# Patient Record
Sex: MALE | Race: WHITE
[De-identification: names, ages, dates, MRNs, and addresses within clinical notes are randomized per-mention and may not be internally consistent; named-entity substitution may affect disease eponyms.]

---

## 2018-03-07 ENCOUNTER — HOSPITAL ENCOUNTER (EMERGENCY)
Dept: HOSPITAL 89 - ER | Age: 23
Discharge: HOME | End: 2018-03-07
Payer: COMMERCIAL

## 2018-03-07 VITALS — WEIGHT: 300 LBS | BODY MASS INDEX: 44.43 KG/M2 | HEIGHT: 69 IN

## 2018-03-07 VITALS — DIASTOLIC BLOOD PRESSURE: 85 MMHG | SYSTOLIC BLOOD PRESSURE: 132 MMHG

## 2018-03-07 DIAGNOSIS — K55.069: Primary | ICD-10-CM

## 2018-03-07 LAB
INR PPP: 0.99
PLATELET COUNT, AUTOMATED: 387 K/UL (ref 150–450)

## 2018-03-07 PROCEDURE — 85610 PROTHROMBIN TIME: CPT

## 2018-03-07 PROCEDURE — 81001 URINALYSIS AUTO W/SCOPE: CPT

## 2018-03-07 PROCEDURE — 84460 ALANINE AMINO (ALT) (SGPT): CPT

## 2018-03-07 PROCEDURE — 82947 ASSAY GLUCOSE BLOOD QUANT: CPT

## 2018-03-07 PROCEDURE — 82435 ASSAY OF BLOOD CHLORIDE: CPT

## 2018-03-07 PROCEDURE — 82040 ASSAY OF SERUM ALBUMIN: CPT

## 2018-03-07 PROCEDURE — 96374 THER/PROPH/DIAG INJ IV PUSH: CPT

## 2018-03-07 PROCEDURE — 84295 ASSAY OF SERUM SODIUM: CPT

## 2018-03-07 PROCEDURE — 71046 X-RAY EXAM CHEST 2 VIEWS: CPT

## 2018-03-07 PROCEDURE — 96361 HYDRATE IV INFUSION ADD-ON: CPT

## 2018-03-07 PROCEDURE — 84450 TRANSFERASE (AST) (SGOT): CPT

## 2018-03-07 PROCEDURE — 83690 ASSAY OF LIPASE: CPT

## 2018-03-07 PROCEDURE — 84132 ASSAY OF SERUM POTASSIUM: CPT

## 2018-03-07 PROCEDURE — 84520 ASSAY OF UREA NITROGEN: CPT

## 2018-03-07 PROCEDURE — 84075 ASSAY ALKALINE PHOSPHATASE: CPT

## 2018-03-07 PROCEDURE — 82565 ASSAY OF CREATININE: CPT

## 2018-03-07 PROCEDURE — 74177 CT ABD & PELVIS W/CONTRAST: CPT

## 2018-03-07 PROCEDURE — 82374 ASSAY BLOOD CARBON DIOXIDE: CPT

## 2018-03-07 PROCEDURE — 99284 EMERGENCY DEPT VISIT MOD MDM: CPT

## 2018-03-07 PROCEDURE — 82310 ASSAY OF CALCIUM: CPT

## 2018-03-07 PROCEDURE — 84155 ASSAY OF PROTEIN SERUM: CPT

## 2018-03-07 PROCEDURE — 85730 THROMBOPLASTIN TIME PARTIAL: CPT

## 2018-03-07 PROCEDURE — 82247 BILIRUBIN TOTAL: CPT

## 2018-03-07 PROCEDURE — 85025 COMPLETE CBC W/AUTO DIFF WBC: CPT

## 2018-03-07 PROCEDURE — 76705 ECHO EXAM OF ABDOMEN: CPT

## 2018-03-07 NOTE — RADIOLOGY IMAGING REPORT
FACILITY: [facility] [place] 

 

PATIENT NAME: Bj Briones

: 1995

MR: [id number]

V: [id number]

EXAM DATE: 

ORDERING PHYSICIAN: MALDONADO RAHMAN

TECHNOLOGIST: 

 

Location: [facility]

Patient: Bj Briones

: 1995

MRN: [id number]

Visit/Account:[id number]

Date of Sevice:  3/07/2018

 

ACCESSION #: [id number]

 

2 VIEWS CHEST

 

INDICATION: Right upper quadrant pain for 2 days.

 

COMPARISON: None available

 

FINDINGS:

Cardiomediastinal silhouette and pulmonary vessels within normal limits.

There is no focal infiltrate or lobar consolidation.

There is no pneumothorax or pleural effusion.

No nodule.

Upper abdomen is unremarkable. No acute bony abnormality.

 

IMPRESSION:

1. No acute cardiopulmonary process.

 

Report Dictated By: Eduardo Uriostegui at 3/7/2018 5:05 PM

 

Report E-Signed By: Eduardo Uriostegui  at 3/7/2018 5:06 PM

 

WSN:[id number]

## 2018-03-07 NOTE — RADIOLOGY IMAGING REPORT
FACILITY: [facility] 

 

PATIENT NAME: Bj Briones

: 1995

MR: [id number]

V: [id number]

EXAM DATE: 

ORDERING PHYSICIAN: MALDONADO RAHMAN

TECHNOLOGIST: 

 

Location: [facility]

Patient: Bj Briones

: 1995

MRN: [id number]

Visit/Account:[id number]

Date of Sevice:  3/07/2018

 

ACCESSION #: [id number]

 

EXAMINATION: CT abdomen and pelvis with IV contrast

 

HISTORY:  Abdominal pain.

 

TECHNIQUE:   Axial CT images of the abdomen and pelvis were obtained with IV contrast, with coronal a
nd sagittal 2D reconstructed images.

One of the following dose optimization techniques was utilized in the performance of this exam: Autom
ated exposure control; adjustment of the mA and/or kV according to the patient's size; or use of an i
terative  reconstruction technique.  Specific details can be referenced in the facility's radiology C
T exam operational policy.

 

Contrast:  80 mL of IV Isovue-370.

 

COMPARISON:  None.

 

FINDINGS:

The patient reportedly vomited after contrast administration, resulting in a significant scan delay. 
Scanning occurred approximately 7 1/2 minutes after contrast administration.

 

Liver:  Fatty infiltration of the liver. Normal hepatic size and morphology.

Gallbladder and bile ducts:  Negative.

Spleen:  Negative.

Pancreas:  Negative.

Adrenal glands:  Negative.

Kidneys:  Normal size and morphology of both kidneys. No hydronephrosis. There is contrast in the toni
al collecting systems related to the delayed scan time. This precludes evaluation for small nonobstru
cting calculi.

 

Bowel and peritoneum:  The small bowel and colon are normal in caliber, without evidence of obstructi
on or any focal inflammatory process. No bowel wall thickening. Unremarkable appendix in the right lo
wer quadrant.

 

There is a localized region of fat stranding in the upper right abdomen just deep to the abdominal wa
ll, suspicious for a focal omental infarct. No adjacent bowel wall thickening. No free fluid or free 
intraperitoneal air.

 

Pelvic  structures:  Negative.

Lymph node assessment:  Negative.

Vessels:  Negative.

 

Musculoskeletal:   Negative.

Body wall:  Negative.

Lung bases:  Negative.

 

IMPRESSION:

1. Small region of localized fat stranding in the right upper abdomen just deep to the abdominal wall
. CT appearance is suspicious for a focal omental infarct.

2. No other acute intra-abdominal findings.

3. Normal appendix.

4. Fatty infiltration of the liver.

 

 

Report Dictated By: Avinash Christensen MD at 3/7/2018 6:21 PM

 

Report E-Signed By: Avinash Christensen MD  at 3/7/2018 6:29 PM

 

WSN:[id number]

## 2018-03-07 NOTE — ER REPORT
History and Physical


Time Seen By MD:  15:47


Hx. of Stated Complaint:  


patient having RU/MQ abd pain, denies radiation, states has been hurting for 2 


days, no N/V/D


 (MALDONADO RAHMAN MD)


HPI/ROS


CHIEF COMPLAINT: Right upper quadrant pain





HISTORY OF PRESENT ILLNESS: 22-year-old male morbidly obese comes emergency 

Department with a complaint of right upper quadrant pain sent here by the 

urgent care. Prior to presentation urgent care he had 2 large sawdust [employer] 

for [employer] and started getting pain of his right upper quadrant. Patient 

denies nausea vomiting diarrhea fever chills no right lower left lower back or 

other discomfort pain is sharp stabbing localized colicky nature had this 

episodically before mostly when he eats fatty foods. Patient has no additional 

complaints at this time





REVIEW OF SYSTEMS:


Respiratory: No cough, no dyspnea.


Cardiovascular: No chest pain, no palpitations.


Gastrointestinal: No vomiting, has abdominal pain.


Musculoskeletal: No back pain.


Remainder of the 14 system rev:  Yes


 (MALDONADO RAHMAN MD)


Allergies:  


Coded Allergies:  


     No Known Drug Allergies (Unverified , 3/7/18)


Home Meds


No Active Prescriptions or Reported Meds


Reviewed Nurses Notes:  Yes


Old Medical Records Reviewed:  Yes


 (MALDONADO RAHMAN MD)


Hx Substance Use Disorder:  No


 (MALDONADO RAHMAN MD)


Constitutional





Vital Sign - Last 24 Hours








 3/7/18 3/7/18 3/7/18 3/7/18





 15:34 15:38 15:40 16:00


 


Temp   99.1 


 


Pulse ???  122 


 


Resp   14 


 


B/P (MAP)  158/91 (113) 158/91 147/79 (101)


 


Pulse Ox   97 


 


    





 3/7/18 3/7/18 3/7/18 3/7/18





 16:04 16:19 16:30 16:34


 


Pulse 98 92  82


 


B/P (MAP)   107/55 (72) 


 


Pulse Ox 92 95  98





 3/7/18 3/7/18 3/7/18 3/7/18





 17:00 17:04 17:09 17:24


 


Pulse  92 101 105


 


B/P (MAP) 119/72 (88)   


 


Pulse Ox  93 93 93





 3/7/18 3/7/18 3/7/18 3/7/18





 17:30 17:39 17:54 18:09


 


Pulse  ??? 97 93


 


B/P (MAP) 139/65 (89)   


 


Pulse Ox   91 91





 3/7/18 3/7/18 3/7/18 3/7/18





 18:24 18:30 18:39 18:45


 


Pulse 101  97 


 


B/P (MAP)  ???/??? (1665)  132/85 (101)


 


Pulse Ox 95  94 





 (PORFIRIO CONCEPCION DO)


Physical Exam


  General Appearance: The patient is alert, has no immediate need for airway 

protection and no current signs of toxicity.  [ ]


Eyes: Pupils equal and round no injection.


Respiratory: Chest is non tender, lungs are clear to auscultation.


Cardiac: regular rate and rhythm [ ]


Gastrointestinal: Abdomen is soft nontender to right upper quadrant deep 

palpation bedside ultrasound demonstrates thickened gallbladder wall but 

otherwise difficult to appreciate due to morbid obesity will have a formal 

ultrasound done no rebound or guarding or masses


Musculoskeletal:  Neck: Neck is supple and non tender.


   Extremities have full range of motion and are non tender.


Skin: No rashes or lesions.


[ ]


DIFFERENTIAL DIAGNOSIS: After history and physical exam differential diagnosis 

was considered for gallbladder cholelithiasis choledocholithiasis and rule out 

acute appendicitis if needed


 (MALDONADO RAHMAN MD)





Medical Decision Making


Data Points


Result Diagram:  


3/7/18 1547                                                                    

            3/7/18 1547





Laboratory





Hematology








Test


  3/7/18


15:47 3/7/18


16:44


 


Red Blood Count


  6.04 M/uL


(4.00-5.60) 


 


 


Mean Corpuscular Volume


  82.6 fL


(80.0-96.0) 


 


 


Mean Corpuscular Hemoglobin


  28.4 pg


(26.0-33.0) 


 


 


Mean Corpuscular Hemoglobin


Concent 34.4 g/dL


(32.0-36.0) 


 


 


Red Cell Distribution Width


  13.8 %


(11.5-14.5) 


 


 


Mean Platelet Volume


  9.5 fL


(7.2-11.1) 


 


 


Neutrophils (%) (Auto)


  68.4 %


(39.4-72.5) 


 


 


Lymphocytes (%) (Auto)


  22.3 %


(17.6-49.6) 


 


 


Monocytes (%) (Auto)


  6.7 %


(4.1-12.4) 


 


 


Eosinophils (%) (Auto)


  1.7 %


(0.4-6.7) 


 


 


Basophils (%) (Auto)


  0.9 %


(0.3-1.4) 


 


 


Nucleated RBC Relative Count


(auto) 0.0 /100WBC 


  


 


 


Neutrophils # (Auto)


  7.8 K/uL


(2.0-7.4) 


 


 


Lymphocytes # (Auto)


  2.5 K/uL


(1.3-3.6) 


 


 


Monocytes # (Auto)


  0.8 K/uL


(0.3-1.0) 


 


 


Eosinophils # (Auto)


  0.2 K/uL


(0.0-0.5) 


 


 


Basophils # (Auto)


  0.1 K/uL


(0.0-0.1) 


 


 


Nucleated RBC Absolute Count


(auto) 0.00 K/uL 


  


 


 


Prothrombin Time


  13.1 seconds


(12.0-14.4) 


 


 


Prothromb Time International


Ratio 0.99 


  


 


 


Activated Partial


Thromboplast Time 29 seconds


(23-35) 


 


 


Sodium Level


  143 mmol/L


(137-145) 


 


 


Potassium Level


  4.1 mmol/L


(3.5-5.0) 


 


 


Chloride Level


  101 mmol/L


() 


 


 


Carbon Dioxide Level


  25 mmol/L


(22-30) 


 


 


Blood Urea Nitrogen


  14 mg/dl


(9-21) 


 


 


Creatinine


  0.90 mg/dl


(0.66-1.25) 


 


 


Glomerular Filtration Rate


Calc > 60.0 


  


 


 


Random Glucose


  90 mg/dl


() 


 


 


Calcium Level


  9.8 mg/dl


(8.4-10.2) 


 


 


Total Bilirubin


  0.4 mg/dl


(0.2-1.3) 


 


 


Aspartate Amino Transf


(AST/SGOT) 47 U/L (0-35) 


  


 


 


Alanine Aminotransferase


(ALT/SGPT) 98 U/L (0-56) 


  


 


 


Alkaline Phosphatase 93 U/L (0-126)  


 


Total Protein


  8.9 gm/dl


(6.3-8.2) 


 


 


Albumin


  4.8 g/dl


(3.5-5.0) 


 


 


Lipase


  188 U/L


() 


 


 


Urine Color  Yellow 


 


Urine Clarity  Clear 


 


Urine pH


  


  6.0 pH


(4.8-9.5)


 


Urine Specific Gravity  1.013 


 


Urine Protein


  


  Negative mg/dL


(NEGATIVE)


 


Urine Glucose (UA)


  


  Negative mg/dL


(NEGATIVE)


 


Urine Ketones


  


  Negative mg/dL


(NEGATIVE)


 


Urine Blood


  


  Negative


(NEGATIVE)


 


Urine Nitrite


  


  Negative


(NEGATIVE)


 


Urine Bilirubin


  


  Negative


(NEGATIVE)


 


Urine Urobilinogen


  


  Negative mg/dL


(0.2-1.9)


 


Urine Leukocyte Esterase


  


  Negative


(NEGATIVE)


 


Urine RBC


  


  None /HPF


(0-2/HPF)


 


Urine WBC


  


  3 /HPF


(0-5/HPF)


 


Urine Squamous Epithelial


Cells 


  Few /LPF


(</=FEW)


 


Urine Renal Epithelial Cells


  


  Few /LPF


(NONE-FEW)


 


Urine Bacteria


  


  Negative /HPF


(NONE-FEW)


 


Urine Mucus


  


  None /HPF


(NONE-FEW)








Chemistry








Test


  3/7/18


15:47 3/7/18


16:44


 


White Blood Count


  11.4 k/uL


(4.5-11.0) 


 


 


Red Blood Count


  6.04 M/uL


(4.00-5.60) 


 


 


Hemoglobin


  17.2 g/dL


(14.0-18.0) 


 


 


Hematocrit


  49.9 %


(42.0-52.0) 


 


 


Mean Corpuscular Volume


  82.6 fL


(80.0-96.0) 


 


 


Mean Corpuscular Hemoglobin


  28.4 pg


(26.0-33.0) 


 


 


Mean Corpuscular Hemoglobin


Concent 34.4 g/dL


(32.0-36.0) 


 


 


Red Cell Distribution Width


  13.8 %


(11.5-14.5) 


 


 


Platelet Count


  387 K/uL


(150-450) 


 


 


Mean Platelet Volume


  9.5 fL


(7.2-11.1) 


 


 


Neutrophils (%) (Auto)


  68.4 %


(39.4-72.5) 


 


 


Lymphocytes (%) (Auto)


  22.3 %


(17.6-49.6) 


 


 


Monocytes (%) (Auto)


  6.7 %


(4.1-12.4) 


 


 


Eosinophils (%) (Auto)


  1.7 %


(0.4-6.7) 


 


 


Basophils (%) (Auto)


  0.9 %


(0.3-1.4) 


 


 


Nucleated RBC Relative Count


(auto) 0.0 /100WBC 


  


 


 


Neutrophils # (Auto)


  7.8 K/uL


(2.0-7.4) 


 


 


Lymphocytes # (Auto)


  2.5 K/uL


(1.3-3.6) 


 


 


Monocytes # (Auto)


  0.8 K/uL


(0.3-1.0) 


 


 


Eosinophils # (Auto)


  0.2 K/uL


(0.0-0.5) 


 


 


Basophils # (Auto)


  0.1 K/uL


(0.0-0.1) 


 


 


Nucleated RBC Absolute Count


(auto) 0.00 K/uL 


  


 


 


Prothrombin Time


  13.1 seconds


(12.0-14.4) 


 


 


Prothromb Time International


Ratio 0.99 


  


 


 


Activated Partial


Thromboplast Time 29 seconds


(23-35) 


 


 


Glomerular Filtration Rate


Calc > 60.0 


  


 


 


Calcium Level


  9.8 mg/dl


(8.4-10.2) 


 


 


Total Bilirubin


  0.4 mg/dl


(0.2-1.3) 


 


 


Aspartate Amino Transf


(AST/SGOT) 47 U/L (0-35) 


  


 


 


Alanine Aminotransferase


(ALT/SGPT) 98 U/L (0-56) 


  


 


 


Alkaline Phosphatase 93 U/L (0-126)  


 


Total Protein


  8.9 gm/dl


(6.3-8.2) 


 


 


Albumin


  4.8 g/dl


(3.5-5.0) 


 


 


Lipase


  188 U/L


() 


 


 


Urine Color  Yellow 


 


Urine Clarity  Clear 


 


Urine pH


  


  6.0 pH


(4.8-9.5)


 


Urine Specific Gravity  1.013 


 


Urine Protein


  


  Negative mg/dL


(NEGATIVE)


 


Urine Glucose (UA)


  


  Negative mg/dL


(NEGATIVE)


 


Urine Ketones


  


  Negative mg/dL


(NEGATIVE)


 


Urine Blood


  


  Negative


(NEGATIVE)


 


Urine Nitrite


  


  Negative


(NEGATIVE)


 


Urine Bilirubin


  


  Negative


(NEGATIVE)


 


Urine Urobilinogen


  


  Negative mg/dL


(0.2-1.9)


 


Urine Leukocyte Esterase


  


  Negative


(NEGATIVE)


 


Urine RBC


  


  None /HPF


(0-2/HPF)


 


Urine WBC


  


  3 /HPF


(0-5/HPF)


 


Urine Squamous Epithelial


Cells 


  Few /LPF


(</=FEW)


 


Urine Renal Epithelial Cells


  


  Few /LPF


(NONE-FEW)


 


Urine Bacteria


  


  Negative /HPF


(NONE-FEW)


 


Urine Mucus


  


  None /HPF


(NONE-FEW)








Coagulation








Test


  3/7/18


15:47


 


Prothrombin Time 13.1 seconds 


 


Prothromb Time International


Ratio 0.99 


 


 


Activated Partial


Thromboplast Time 29 seconds 


 








Urinalysis








Test


  3/7/18


16:44


 


Urine Color Yellow 


 


Urine Clarity Clear 


 


Urine pH


  6.0 pH


(4.8-9.5)


 


Urine Specific Gravity 1.013 


 


Urine Protein


  Negative mg/dL


(NEGATIVE)


 


Urine Glucose (UA)


  Negative mg/dL


(NEGATIVE)


 


Urine Ketones


  Negative mg/dL


(NEGATIVE)


 


Urine Blood


  Negative


(NEGATIVE)


 


Urine Nitrite


  Negative


(NEGATIVE)


 


Urine Bilirubin


  Negative


(NEGATIVE)


 


Urine Urobilinogen


  Negative mg/dL


(0.2-1.9)


 


Urine Leukocyte Esterase


  Negative


(NEGATIVE)


 


Urine RBC


  None /HPF


(0-2/HPF)


 


Urine WBC


  3 /HPF


(0-5/HPF)


 


Urine Squamous Epithelial


Cells Few /LPF


(</=FEW)


 


Urine Renal Epithelial Cells


  Few /LPF


(NONE-FEW)


 


Urine Bacteria


  Negative /HPF


(NONE-FEW)


 


Urine Mucus


  None /HPF


(NONE-FEW)





 (PORFIRIO CONCEPCION DO)





EKG/Imaging


Imaging


Results:


CT scan of the abdomen and pelvis with IV contrast was obtained.  The results 

of the study are 


EXAMINATION: CT abdomen and pelvis with IV contrast


 


HISTORY:  Abdominal pain.


 


TECHNIQUE:   Axial CT images of the abdomen and pelvis were obtained with IV 

contrast, with coronal and sagittal 2D reconstructed images.


One of the following dose optimization techniques was utilized in the 

performance of this exam: Automated exposure control; adjustment of the mA and/

or kV according to the patient's size; or use of an iterative  reconstruction 

technique.  Specific details can be referenced in the facility's radiology CT 

exam operational policy.


 


Contrast:  80 mL of IV Isovue-370.


 


COMPARISON:  None.


 


FINDINGS:


The patient reportedly vomited after contrast administration, resulting in a 

significant scan delay. Scanning occurred approximately 7 1/2 minutes after 

contrast administration.


 


Liver:  Fatty infiltration of the liver. Normal hepatic size and morphology.


Gallbladder and bile ducts:  Negative.


Spleen:  Negative.


Pancreas:  Negative.


Adrenal glands:  Negative.


Kidneys:  Normal size and morphology of both kidneys. No hydronephrosis. There 

is contrast in the renal collecting systems related to the delayed scan time. 

This precludes evaluation for small nonobstructing calculi.


 


Bowel and peritoneum:  The small bowel and colon are normal in caliber, without 

evidence of obstruction or any focal inflammatory process. No bowel wall 

thickening. Unremarkable appendix in the right lower quadrant.


 


There is a localized region of fat stranding in the upper right abdomen just 

deep to the abdominal wall, suspicious for a focal omental infarct. No adjacent 

bowel wall thickening. No free fluid or free intraperitoneal air.


 


Pelvic  structures:  Negative.


Lymph node assessment:  Negative.


Vessels:  Negative.


 


Musculoskeletal:   Negative.


Body wall:  Negative.


Lung bases:  Negative.


 


IMPRESSION:


1. Small region of localized fat stranding in the right upper abdomen just deep 

to the abdominal wall. CT appearance is suspicious for a focal omental infarct.


2. No other acute intra-abdominal findings.


3. Normal appendix.


4. Fatty infiltration of the liver.


 





The study was read by the radiologist.  I viewed the images myself on the PACS 

system.


 (PORFIRIO CONCEPCION DO)





ED Course/Re-evaluation


ED Course


Care was assumed at shift change from Dr. Rahman with a diagnostic CT pending 

of the abdomen and pelvis to rule out occult pathology.  Patient with right 

lower quadrant tenderness and right abdominal pain, worrisome for potential 

appendicitis.  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  Further 

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 

engine for extended periods of time prior to presenting with this pain.  I 

suspect this is the mechanism of his infarction.  He's advised to conservative 

treatment plan ibuprofen for pain relief.  Advised to follow-up with the ER for 

any worsening.  He is given Gen. surgery's number as well.


Decision to Disposition Date:  Mar 7, 2018


Decision to Disposition Time:  18:43


 (PORFIRIO CONCEPCION DO)





Depart


Departure


Latest Vital Signs





Vital Signs








  Date Time  Temp Pulse Resp B/P (MAP) Pulse Ox O2 Delivery O2 Flow Rate FiO2


 


3/7/18 18:45    132/85 (101)    


 


3/7/18 18:39  97   94   


 


3/7/18 15:40 99.1  14     





 (PORFIRIO CONCEPCION DO)


Impression:  


 Primary Impression:  


 Omental infarction


Condition:  Improved


Disposition:  HOME OR SELF-CARE


Referrals:  


MELVINA CALIXTO MD, TOM MD


New Scripts


No Active Prescriptions or Reported Meds


Patient Instructions:  Abdominal Pain (ED)





Additional Instructions:  


Take ibuprofen 200 mg 3 tablets 3 times a day with food


Follow clear liquid diet for 24 hours and advance to Ryan diet for 24 hours, 

bananas, rice, applesauce, toast


Avoid fatty food, greasy foods, dairy and vegetables


Follow-up with the doctor listed on your paperwork if unimproved in 3-5 days or 

you can go to general surgeon, MALDONADO Canales MD Mar 7, 2018 15:51


PORFIRIO CONCEPCION DO Mar 7, 2018 18:45

## 2018-03-07 NOTE — RADIOLOGY IMAGING REPORT
FACILITY: [facility] [place] 

 

PATIENT NAME: Bj Briones

: 1995

MR: [id number]

V: [id number]

EXAM DATE: 

ORDERING PHYSICIAN: MALDONADO RAHMAN

TECHNOLOGIST: 

 

Location: [facility] [place]

Patient: Bj Briones

: 1995

MRN: [id number]

Visit/Account:[id number]

Date of Sevice:  3/07/2018

 

ACCESSION #: [id number]

 

EXAMINATION: Focused right upper quadrant ultrasound

 

COMPARISON: None

 

HISTORY: Right upper quadrant abdominal pain.

 

Findings: Standard right upper quadrant abdominal ultrasound is performed.

 

Pancreas: Visualized portions of the pancreas are unremarkable.

 

Liver and portal vein: Diffusely echogenic hepatic parenchyma. Portal vein is patent.

 

Gallbladder and biliary system: No gallbladder stone or sludge. No wall thickening, pericholecystic f
luid, or sonographic Sanz's. The common bile duct is not dilated.

 

Aorta and IVC: The visualized aorta and IVC are patent.

 

Kidneys: The right kidney measures 12.4 x 3.7 x 5.5 cm .  No renal mass, stone, or hydronephrosis.

 

Ascites: None.

 

IMPRESSION:

 

1. No sonographic findings of acute disease in the abdomen right upper quadrant.

2. Diffusely echogenic liver suggestive of hepatic steatosis.

 

Report Dictated By: Dayron Clement MD at 3/7/2018 4:49 PM

 

Report E-Signed By: Dayron Clement MD  at 3/7/2018 4:51 PM

 

WSN:[id number]